# Patient Record
Sex: MALE | Race: WHITE | NOT HISPANIC OR LATINO
[De-identification: names, ages, dates, MRNs, and addresses within clinical notes are randomized per-mention and may not be internally consistent; named-entity substitution may affect disease eponyms.]

---

## 2019-04-09 ENCOUNTER — TRANSCRIPTION ENCOUNTER (OUTPATIENT)
Age: 24
End: 2019-04-09

## 2019-08-03 ENCOUNTER — EMERGENCY (EMERGENCY)
Facility: HOSPITAL | Age: 24
LOS: 1 days | Discharge: HOME | End: 2019-08-03
Attending: EMERGENCY MEDICINE
Payer: COMMERCIAL

## 2019-08-03 VITALS
TEMPERATURE: 98 F | DIASTOLIC BLOOD PRESSURE: 71 MMHG | HEART RATE: 78 BPM | OXYGEN SATURATION: 99 % | RESPIRATION RATE: 16 BRPM | WEIGHT: 169.98 LBS | HEIGHT: 73 IN | SYSTOLIC BLOOD PRESSURE: 115 MMHG

## 2019-08-03 PROCEDURE — 12044 INTMD RPR N-HF/GENIT7.6-12.5: CPT

## 2019-08-03 PROCEDURE — 73564 X-RAY EXAM KNEE 4 OR MORE: CPT | Mod: 26,RT

## 2019-08-03 PROCEDURE — 12044 INTMD RPR N-HF/GENIT7.6-12.5: CPT | Mod: 54

## 2019-08-03 PROCEDURE — 99283 EMERGENCY DEPT VISIT LOW MDM: CPT | Mod: 25

## 2019-08-03 PROCEDURE — 90471 IMMUNIZATION ADMIN: CPT

## 2019-08-03 PROCEDURE — 73564 X-RAY EXAM KNEE 4 OR MORE: CPT

## 2019-08-03 PROCEDURE — 90715 TDAP VACCINE 7 YRS/> IM: CPT

## 2019-08-03 RX ORDER — TETANUS TOXOID, REDUCED DIPHTHERIA TOXOID AND ACELLULAR PERTUSSIS VACCINE, ADSORBED 5; 2.5; 8; 8; 2.5 [IU]/.5ML; [IU]/.5ML; UG/.5ML; UG/.5ML; UG/.5ML
0.5 SUSPENSION INTRAMUSCULAR ONCE
Refills: 0 | Status: COMPLETED | OUTPATIENT
Start: 2019-08-03 | End: 2019-08-03

## 2019-08-03 RX ORDER — CEPHALEXIN 500 MG
1 CAPSULE ORAL
Qty: 21 | Refills: 0
Start: 2019-08-03 | End: 2019-08-09

## 2019-08-03 RX ORDER — CEPHALEXIN 500 MG
500 CAPSULE ORAL ONCE
Refills: 0 | Status: COMPLETED | OUTPATIENT
Start: 2019-08-03 | End: 2019-08-03

## 2019-08-03 RX ORDER — IBUPROFEN 200 MG
600 TABLET ORAL ONCE
Refills: 0 | Status: COMPLETED | OUTPATIENT
Start: 2019-08-03 | End: 2019-08-03

## 2019-08-03 RX ADMIN — TETANUS TOXOID, REDUCED DIPHTHERIA TOXOID AND ACELLULAR PERTUSSIS VACCINE, ADSORBED 0.5 MILLILITER(S): 5; 2.5; 8; 8; 2.5 SUSPENSION INTRAMUSCULAR at 20:31

## 2019-08-03 RX ADMIN — Medication 500 MILLIGRAM(S): at 20:31

## 2019-08-03 RX ADMIN — Medication 600 MILLIGRAM(S): at 20:31

## 2019-08-03 NOTE — ED ADULT NURSE NOTE - OBJECTIVE STATEMENT
Pt A&Ox4 c/o  playing horse shoes and falling on the metal pole and cutting open knee at this time, has laceration to right knee. Pt resting comfortably, VSS, no signs of distress at this time.

## 2019-08-03 NOTE — ED PROVIDER NOTE - PHYSICAL EXAMINATION
right knee lateral aspect with 8 cm lac noted at mid deep to Sq area , bleeding control, DP pulse +2 able to move the toes light touch sensation grossly intact able to bear weight , Cr less than 3 sec .

## 2019-08-03 NOTE — ED PROVIDER NOTE - OBJECTIVE STATEMENT
24 Y.yenny  presents in ER  S.p about 1 hr PTA while he was jumping  tripped and landed on the horse pole that he cut the right side of the knee . states was bleeding and he warped it and came in Er . he can not recall when was last tetanus injection . denies any numbness or tingling  in LE or any other trauma or injury . no other compliant or concern

## 2019-08-03 NOTE — ED ADULT NURSE NOTE - NSIMPLEMENTINTERV_GEN_ALL_ED
Implemented All Universal Safety Interventions:  [place] to call system. Call bell, personal items and telephone within reach. Instruct patient to call for assistance. Room bathroom lighting operational. Non-slip footwear when patient is off stretcher. Physically safe environment: no spills, clutter or unnecessary equipment. Stretcher in lowest position, wheels locked, appropriate side rails in place.

## 2019-08-03 NOTE — ED ADULT NURSE NOTE - CHPI ED NUR SYMPTOMS NEG
no rectal pain/no fever/no drainage/no vomiting/no bleeding at site/no purulent drainage/no blood in mucus/no chills

## 2019-08-03 NOTE — ED PROCEDURE NOTE - ATTENDING CONTRIBUTION TO CARE
lac repair agree  I, Juliet Jain, performed the initial face to face bedside interview with this patient regarding history of present illness, review of symptoms and relevant past medical, social and family history.  I completed an independent physical examination.  I was the initial provider who evaluated this patient. I have signed out the follow up of any pending tests (i.e. labs, radiological studies) to the ACP.  I have communicated the patient’s plan of care and disposition with the ACP.

## 2019-08-03 NOTE — ED PROVIDER NOTE - CLINICAL SUMMARY MEDICAL DECISION MAKING FREE TEXT BOX
24 Y.o male  presents in ER  S.p about 1 hr PTA while he was jumping tripped and landed on the horse pole with right knee lac   tetsnus- mot[facility] - keflex - wound care - xray right knee 24 Y.o male  presents in ER  S.p about 1 hr PTA while he was jumping tripped and landed on the horse pole with right knee lac   tetsnus- motrin - keflex - wound care - xray right knee  lac to rle needs repair needs tet pe as described dc

## 2019-08-14 ENCOUNTER — TRANSCRIPTION ENCOUNTER (OUTPATIENT)
Age: 24
End: 2019-08-14

## 2019-12-01 ENCOUNTER — TRANSCRIPTION ENCOUNTER (OUTPATIENT)
Age: 24
End: 2019-12-01

## 2020-05-26 NOTE — ED PROVIDER NOTE - ATTENDING CONTRIBUTION TO CARE
inpatient Medical/Surgical team
I, Juliet Jain, performed the initial face to face bedside interview with this patient regarding history of present illness, review of symptoms and relevant past medical, social and family history.  I completed an independent physical examination.  I was the initial provider who evaluated this patient. I have signed out the follow up of any pending tests (i.e. labs, radiological studies) to the ACP.  I have communicated the patient’s plan of care and disposition with the ACP.

## 2023-05-23 ENCOUNTER — NON-APPOINTMENT (OUTPATIENT)
Age: 28
End: 2023-05-23

## 2023-05-24 PROBLEM — Z00.00 ENCOUNTER FOR PREVENTIVE HEALTH EXAMINATION: Status: ACTIVE | Noted: 2023-05-24

## 2023-05-25 PROBLEM — Z78.9 OTHER SPECIFIED HEALTH STATUS: Chronic | Status: ACTIVE | Noted: 2019-08-03

## 2023-05-26 ENCOUNTER — APPOINTMENT (OUTPATIENT)
Dept: ORTHOPEDIC SURGERY | Facility: CLINIC | Age: 28
End: 2023-05-26
Payer: COMMERCIAL

## 2023-05-26 VITALS
DIASTOLIC BLOOD PRESSURE: 67 MMHG | WEIGHT: 182 LBS | HEART RATE: 73 BPM | SYSTOLIC BLOOD PRESSURE: 117 MMHG | BODY MASS INDEX: 24.12 KG/M2 | HEIGHT: 73 IN

## 2023-05-26 DIAGNOSIS — M79.646 PAIN IN UNSPECIFIED HAND: ICD-10-CM

## 2023-05-26 DIAGNOSIS — M79.643 PAIN IN UNSPECIFIED HAND: ICD-10-CM

## 2023-05-26 PROCEDURE — 99204 OFFICE O/P NEW MOD 45 MIN: CPT | Mod: 25

## 2023-05-26 PROCEDURE — 29130 APPL FINGER SPLINT STATIC: CPT

## 2023-05-26 NOTE — HISTORY OF PRESENT ILLNESS
[FreeTextEntry1] : Lincoln is a pleasant 28-year-old male who unfortunately suffered a crush injury to his right ring finger a few days prior to today's visit.  He went to urgent care and subsequently was told to follow-up

## 2023-05-26 NOTE — PHYSICAL EXAM
[de-identified] : He is well-appearing on exam\par Examination of the right hand specifically the right ring finger reveals significant fingertip discoloration swelling bruising and ecchymosis traveling all the way down to the base of the ring finger.  He has both active extension and flexion of the PIP and DIP.  He has tenderness at the level of the distal phalanx.  There is mild bruising subungual.  There are no signs of infection. [de-identified] : I did review outside x-rays of the right ring finger ordered by a separate provider denoting a longitudinal fracture of the distal phalanx of the right ring finger

## 2023-05-26 NOTE — ASSESSMENT
[FreeTextEntry1] : ASSESSMENT:\par \par The patient comes in today with acute right ring finger crush injury with significant swelling bruising and ecchymosis and discoloration of the fingertip.  It is quite tender for him.  At this point in time I do recommend anti-inflammatory medication and proper splinting to avoid stiffness of the adjacent PIP joint.  We have discussed prognosis.  He will be nonweightbearing and no gym activities until cleared.\par [I have diagnosed the patient today with a new diagnosis - This may diminish bodily function for the extremity.] \par \par [For this I was able to review other physician’s note(s) including reviewing other tests, imaging results as well as personally view these results for my own interpretation.]\par \par The patient was adequately and thoroughly informed of my assessment of their current condition(s). \par A prescription for meloxicam was given today. The patient was instructed to take this with food and discontinue other NSAIDs while taking this. The risks, benefits and black box warnings were discussed. The patient was instructed to discontinue the medication if it began hurting his stomach.\par \par DISCUSSION:\par 1.  I have prescribed meloxicam as above\par 2.  For his right ring finger distal phalanx fracture injury status post crush today he was fitted with a finger mallet splint.  He tolerated placement of this well\par 3.  I would like to see him again in 4 weeks time for repeat x-rays of the right ring finger and reassessment clinically.  We have discussed the likelihood of the nail falling off\par

## 2023-06-13 ENCOUNTER — NON-APPOINTMENT (OUTPATIENT)
Age: 28
End: 2023-06-13

## 2023-07-06 ENCOUNTER — APPOINTMENT (OUTPATIENT)
Dept: ORTHOPEDIC SURGERY | Facility: CLINIC | Age: 28
End: 2023-07-06

## 2025-02-15 NOTE — ED ADULT TRIAGE NOTE - CHIEF COMPLAINT QUOTE
Fever, cough,congestion, nausea since tues  
pt reports lac to right lateral knee, occurred from a pole used for horse shoes.